# Patient Record
Sex: MALE | Race: WHITE | Employment: OTHER | URBAN - METROPOLITAN AREA
[De-identification: names, ages, dates, MRNs, and addresses within clinical notes are randomized per-mention and may not be internally consistent; named-entity substitution may affect disease eponyms.]

---

## 2021-12-15 ENCOUNTER — TRANSCRIBE ORDER (OUTPATIENT)
Dept: REGISTRATION | Age: 56
End: 2021-12-15

## 2021-12-15 ENCOUNTER — HOSPITAL ENCOUNTER (OUTPATIENT)
Dept: NON INVASIVE DIAGNOSTICS | Age: 56
Discharge: HOME | End: 2021-12-15
Payer: COMMERCIAL

## 2021-12-15 DIAGNOSIS — Z01.818 PRE-OP EXAM: ICD-10-CM

## 2021-12-15 DIAGNOSIS — Z01.818 PRE-OP EXAM: Primary | ICD-10-CM

## 2021-12-15 LAB
ATRIAL RATE: 43 BPM
CALCULATED P AXIS, ECG09: 62 DEGREES
CALCULATED R AXIS, ECG10: 45 DEGREES
CALCULATED T AXIS, ECG11: 37 DEGREES
DIAGNOSIS, 93000: NORMAL
P-R INTERVAL, ECG05: 154 MS
Q-T INTERVAL, ECG07: 474 MS
QRS DURATION, ECG06: 94 MS
QTC CALCULATION (BEZET), ECG08: 400 MS
VENTRICULAR RATE, ECG03: 43 BPM

## 2021-12-15 PROCEDURE — 93005 ELECTROCARDIOGRAM TRACING: CPT

## 2021-12-23 DIAGNOSIS — M16.12 PRIMARY OSTEOARTHRITIS OF LEFT HIP: Primary | ICD-10-CM

## 2021-12-29 ENCOUNTER — OFFICE VISIT (OUTPATIENT)
Dept: ORTHOPEDIC SURGERY | Age: 56
End: 2021-12-29
Payer: COMMERCIAL

## 2021-12-29 DIAGNOSIS — M16.12 PRIMARY OSTEOARTHRITIS OF LEFT HIP: Primary | ICD-10-CM

## 2021-12-29 PROCEDURE — 97161 PT EVAL LOW COMPLEX 20 MIN: CPT | Performed by: PHYSICAL THERAPIST

## 2021-12-29 NOTE — PROGRESS NOTES
Patient Initial Evaluation    Patient Name: Zoila Tijerina  Date:2021  : 1965  [x]  Patient  Verified  Payor: Eduardo Headings / Plan: Mathieu Clayton / Product Type: Commerical /    Total Treatment Time (min): 15    Treatment Area: Left hip    HPI    The patient is a 49-year-old male referred to physical therapy by Dr. Marycruz Hallman total hip arthroplasty anterior approach to be completed on 2021. The patient was accompanied by his wife. She will be with him following the surgery. He states that he has 4 steps to enter his home with a handrail on the left side. He plans to stay on the second floor of his home following surgery but is able to stay on the first floor if needed. The patient plans to have a FWW. The patient plans to have home health physical therapy within 24 hours of surgery. OBJECTIVE    Transfers: Patient was instructed in use of a FWW. Range of motion: Deferred at this time    Strength: Deferred at this time    Gait: Patient was instructed in and provided with printout describing proper gait technique with use of FWW. Treatment:  Provided patient with written/pictorial home exercise program for range of motion, quad activation, and DVT prevention. Patient was also provided with printout for hip precautions and proper gait/ stair technique. Total treatment time 15 min. ASSESSMENT    Patient was educated on impairments related to gait, swelling, decreased range of motion, quad activation, lower extremity strength, balance, impaired ability to ambulate, negotiate stairs, perform ADLs and participate in desired activities following left total hip replacement. He was instructed in exercises and gait training to address these limitations following surgery. Goals  1. Patient will demonstrate compliance with home exercise program.      ICD-10-CM ICD-9-CM    1.  Primary osteoarthritis of left hip  M16.12 715.15      PLAN  Patient plans to receive home health PT within 24 hours following surgery.

## 2022-01-06 DIAGNOSIS — Z96.642 STATUS POST LEFT HIP REPLACEMENT: Primary | ICD-10-CM

## 2022-01-06 RX ORDER — OXYCODONE HYDROCHLORIDE 5 MG/1
5 TABLET ORAL
Qty: 42 TABLET | Refills: 0 | Status: SHIPPED | OUTPATIENT
Start: 2022-01-06 | End: 2022-01-13

## 2022-01-06 RX ORDER — MELOXICAM 7.5 MG/1
7.5 TABLET ORAL DAILY
Qty: 30 TABLET | Refills: 0 | Status: SHIPPED | OUTPATIENT
Start: 2022-01-06

## 2022-01-06 RX ORDER — TRAMADOL HYDROCHLORIDE 50 MG/1
50 TABLET ORAL
Qty: 42 TABLET | Refills: 0 | Status: SHIPPED | OUTPATIENT
Start: 2022-01-06 | End: 2022-01-20

## 2022-01-06 RX ORDER — FAMOTIDINE 20 MG/1
20 TABLET, FILM COATED ORAL 2 TIMES DAILY
Qty: 60 TABLET | Refills: 0 | Status: SHIPPED | OUTPATIENT
Start: 2022-01-06

## 2022-01-10 ENCOUNTER — HOME HEALTH ADMISSION (OUTPATIENT)
Dept: HOME HEALTH SERVICES | Facility: HOME HEALTH | Age: 57
End: 2022-01-10
Payer: COMMERCIAL

## 2022-01-12 ENCOUNTER — HOME CARE VISIT (OUTPATIENT)
Dept: SCHEDULING | Facility: HOME HEALTH | Age: 57
End: 2022-01-12
Payer: COMMERCIAL

## 2022-01-12 PROCEDURE — G0151 HHCP-SERV OF PT,EA 15 MIN: HCPCS

## 2022-01-12 PROCEDURE — 400013 HH SOC

## 2022-01-13 VITALS
OXYGEN SATURATION: 99 % | RESPIRATION RATE: 18 BRPM | DIASTOLIC BLOOD PRESSURE: 70 MMHG | HEART RATE: 56 BPM | SYSTOLIC BLOOD PRESSURE: 130 MMHG | TEMPERATURE: 97.5 F

## 2022-01-14 ENCOUNTER — HOME CARE VISIT (OUTPATIENT)
Dept: SCHEDULING | Facility: HOME HEALTH | Age: 57
End: 2022-01-14
Payer: COMMERCIAL

## 2022-01-14 PROCEDURE — G0151 HHCP-SERV OF PT,EA 15 MIN: HCPCS

## 2022-01-15 VITALS
SYSTOLIC BLOOD PRESSURE: 122 MMHG | TEMPERATURE: 97.6 F | HEART RATE: 71 BPM | OXYGEN SATURATION: 99 % | RESPIRATION RATE: 18 BRPM | DIASTOLIC BLOOD PRESSURE: 72 MMHG

## 2022-01-19 ENCOUNTER — HOME CARE VISIT (OUTPATIENT)
Dept: SCHEDULING | Facility: HOME HEALTH | Age: 57
End: 2022-01-19
Payer: COMMERCIAL

## 2022-01-19 PROCEDURE — G0151 HHCP-SERV OF PT,EA 15 MIN: HCPCS

## 2022-01-20 VITALS
DIASTOLIC BLOOD PRESSURE: 70 MMHG | TEMPERATURE: 97.6 F | OXYGEN SATURATION: 99 % | RESPIRATION RATE: 18 BRPM | HEART RATE: 70 BPM | SYSTOLIC BLOOD PRESSURE: 120 MMHG

## 2022-02-04 ENCOUNTER — OFFICE VISIT (OUTPATIENT)
Dept: ORTHOPEDIC SURGERY | Age: 57
End: 2022-02-04
Payer: COMMERCIAL

## 2022-02-04 VITALS — BODY MASS INDEX: 25.06 KG/M2 | WEIGHT: 185 LBS | HEIGHT: 72 IN

## 2022-02-04 DIAGNOSIS — Z96.642 STATUS POST LEFT HIP REPLACEMENT: Primary | ICD-10-CM

## 2022-02-04 PROCEDURE — 99024 POSTOP FOLLOW-UP VISIT: CPT | Performed by: ORTHOPAEDIC SURGERY

## 2022-02-04 NOTE — PROGRESS NOTES
Amara Banda (: 1965) is a 60 y.o. male patient, here for evaluation of the following chief complaint(s):  Hip Pain (left hip follow up)       ASSESSMENT/PLAN:  Below is the assessment and plan developed based on review of pertinent history, physical exam, labs, studies, and medications. Radiographs reviewed including 2 views of the left hip. Status post hip arthroplasty. No evidence of aseptic loosening. Leg lengths and offset are appropriate. Status post left hip arthroplasty. The patient is doing well and they are happy with progress. I would like to see them back in 6 weeks. 1. Status post left hip replacement  -     XR HIP LT W OR WO PELV 2-3 VWS; Future      Encounter Diagnosis   Name Primary?  Status post left hip replacement Yes        No follow-ups on file. SUBJECTIVE/OBJECTIVE:  Amara Banda (: 1965) is a 60 y.o. male who presents today for the following:  Chief Complaint   Patient presents with    Hip Pain     left hip follow up       57-year-old male comes in today for follow-up. Status post a left total hip replacement on 2023. Postoperatively he is doing very well. He is walking unlimited distances. He does notice a slight limp but it is improving. He does not feel that 1 leg is longer than the other. Overall he is very happy pleased with his progress and outcomes thus far    IMAGING:  XR Results (most recent):  Results from Appointment encounter on 22    XR HIP LT W OR WO PELV 2-3 VWS    Narrative  Radiographs reviewed including 3 views of the left hip. Status post hip arthroplasty. No evidence of aseptic loosening. Leg lengths and offset are appropriate. No Known Allergies    Current Outpatient Medications   Medication Sig    aspirin 81 mg chewable tablet Take 81 mg by mouth daily.  acetaminophen (TYLENOL) 500 mg tablet Take 500 mg by mouth every six (6) hours as needed for Fever or Pain.     meloxicam (MOBIC) 7.5 mg tablet Take 1 Tablet by mouth daily.  famotidine (PEPCID) 20 mg tablet Take 1 Tablet by mouth two (2) times a day. No current facility-administered medications for this visit. No past medical history on file. No past surgical history on file. No family history on file. Social History     Tobacco Use    Smoking status: Not on file    Smokeless tobacco: Not on file   Substance Use Topics    Alcohol use: Not on file        All systems reviewed x 12 and were negative with the exception of None      No flowsheet data found. Vitals:  Ht 6' (1.829 m)   Wt 185 lb (83.9 kg)   BMI 25.09 kg/m²    Body mass index is 25.09 kg/m². Physical Exam    General: NAD    Cardiac: Extremities well perfused. Respiratory: Nonlabored breathing. LLE: Incision healing well. Minimal numbness over the LFCN. Normal gait and station. Negative stinchfield. No pain with gentle internal and external rotation. .  Motor strength is grossly intact. Skin warm well perfused. Capillary refill <2 sec. Kash Smallwood M.D. was available for immediate consultation as the supervising physician. An electronic signature was used to authenticate this note.   -- Eleni Valentin PA-C

## 2022-03-18 ENCOUNTER — OFFICE VISIT (OUTPATIENT)
Dept: ORTHOPEDIC SURGERY | Age: 57
End: 2022-03-18
Payer: COMMERCIAL

## 2022-03-18 VITALS — HEIGHT: 72 IN | BODY MASS INDEX: 25.06 KG/M2 | WEIGHT: 185 LBS

## 2022-03-18 DIAGNOSIS — Z98.890 STATUS POST SURGERY: Primary | ICD-10-CM

## 2022-03-18 PROCEDURE — 99024 POSTOP FOLLOW-UP VISIT: CPT | Performed by: ORTHOPAEDIC SURGERY

## 2022-03-18 NOTE — PROGRESS NOTES
Leslie Lanza (: 1965) is a 60 y.o. male patient, here for evaluation of the following chief complaint(s):  Hip Pain (left hip follow up)       ASSESSMENT/PLAN:  Below is the assessment and plan developed based on review of pertinent history, physical exam, labs, studies, and medications. Radiographs reviewed including 2 views of the left hip. Status post hip arthroplasty. No evidence of aseptic loosening. Leg lengths and offset are appropriate. Status post left hip arthroplasty. The patient is doing well and they are happy with progress. Follow-up 9 months. 1. Status post surgery      Encounter Diagnosis   Name Primary?  Status post surgery Yes        No follow-ups on file. SUBJECTIVE/OBJECTIVE:  Leslie Lanza (: 1965) is a 60 y.o. male who presents today for the following:  Chief Complaint   Patient presents with    Hip Pain     left hip follow up       Status post left total hip. Doing fantastic. 10 weeks out. No pain. Doing everything he wants to do. He says his range of motion is also significantly improved. Leg lengths feel equal.    IMAGING:  XR Results (most recent):  Results from Appointment encounter on 22    XR HIP LT W OR WO PELV 2-3 VWS    Narrative  Radiographs reviewed including 3 views of the left hip. Status post hip arthroplasty. No evidence of aseptic loosening. Leg lengths and offset are appropriate. No Known Allergies    Current Outpatient Medications   Medication Sig    acetaminophen (TYLENOL) 500 mg tablet Take 500 mg by mouth every six (6) hours as needed for Fever or Pain.  aspirin 81 mg chewable tablet Take 81 mg by mouth daily. (Patient not taking: Reported on 3/18/2022)    meloxicam (MOBIC) 7.5 mg tablet Take 1 Tablet by mouth daily. (Patient not taking: Reported on 3/18/2022)    famotidine (PEPCID) 20 mg tablet Take 1 Tablet by mouth two (2) times a day.  (Patient not taking: Reported on 3/18/2022)     No current facility-administered medications for this visit. No past medical history on file. No past surgical history on file. No family history on file. Social History     Tobacco Use    Smoking status: Not on file    Smokeless tobacco: Not on file   Substance Use Topics    Alcohol use: Not on file        All systems reviewed x 12 and were negative with the exception of None      No flowsheet data found. Vitals:  Ht 6' (1.829 m)   Wt 185 lb (83.9 kg)   BMI 25.09 kg/m²    Body mass index is 25.09 kg/m². Physical Exam    General: NAD    Cardiac: Extremities well perfused. Respiratory: Nonlabored breathing. LLE: Incision clean dry and intact with no erythema. Minimal numbness over the LFCN. Normal gait and station. Negative stinchfield. No pain with gentle internal and external rotation. .  Motor strength is grossly intact. Skin warm well perfused. Capillary refill <2 sec. An electronic signature was used to authenticate this note.   -- Rosi Smith MD

## 2022-06-20 DIAGNOSIS — Z96.642 STATUS POST LEFT HIP REPLACEMENT: Primary | ICD-10-CM

## 2022-06-20 RX ORDER — AMOXICILLIN 500 MG/1
CAPSULE ORAL
Qty: 4 CAPSULE | Refills: 2 | Status: SHIPPED | OUTPATIENT
Start: 2022-06-20